# Patient Record
Sex: FEMALE | Race: WHITE
[De-identification: names, ages, dates, MRNs, and addresses within clinical notes are randomized per-mention and may not be internally consistent; named-entity substitution may affect disease eponyms.]

---

## 2022-01-24 ENCOUNTER — HOSPITAL ENCOUNTER (EMERGENCY)
Dept: HOSPITAL 63 - ER | Age: 22
Discharge: HOME | End: 2022-01-24
Payer: COMMERCIAL

## 2022-01-24 VITALS
DIASTOLIC BLOOD PRESSURE: 77 MMHG | SYSTOLIC BLOOD PRESSURE: 114 MMHG | SYSTOLIC BLOOD PRESSURE: 114 MMHG | DIASTOLIC BLOOD PRESSURE: 77 MMHG

## 2022-01-24 VITALS — WEIGHT: 138.89 LBS | HEIGHT: 66 IN | BODY MASS INDEX: 22.32 KG/M2
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
## 2022-01-24 DIAGNOSIS — S93.401A: Primary | ICD-10-CM

## 2022-01-24 DIAGNOSIS — Z88.5: ICD-10-CM

## 2022-01-24 PROCEDURE — 81025 URINE PREGNANCY TEST: CPT

## 2022-01-24 PROCEDURE — 73610 X-RAY EXAM OF ANKLE: CPT

## 2022-01-24 PROCEDURE — 99283 EMERGENCY DEPT VISIT LOW MDM: CPT

## 2022-01-24 NOTE — PHYS DOC
Past History


Past Surgical History:  Other


Additional Past Surgical Histo:  wisdom teeth


Alcohol Use:  Occasionally





General Adult


EDM:


Chief Complaint:  ANKLE PROBLEM





HPI:


HPI:





Patient is a [6-year-old female coming in for right ankle pain and swelling.  

Patient states she was playing basketball when she came down and felt her right 

foot rolled inward.  Has not attempted to bear weight since.  Has had previous 

sprains this ankle surgeries.  Otherwise has been well





Review of Systems:


Review of Systems:


All other systems within normal limits except for as noted in the HPI





Current Medications:


Current Meds:





Current Medications








 Medications


  (Trade)  Dose


 Ordered  Sig/Melissa  Start Time


 Stop Time Status Last Admin


Dose Admin


 


 Ibuprofen


  (Motrin)  600 mg  1X  ONCE  22 19:45


 22 19:46 DC 22 19:51


600 MG











Allergies:


Allergies:





Allergies








Coded Allergies Type Severity Reaction Last Updated Verified


 


  hydrocodone Allergy Severe Rash 22 Yes











Physical Exam:


PE:


Constitutional: Well developed, well nourished, no acute distress, non-toxic ap

pearance. []


HENT: Normocephalic, atraumatic, bilateral external ears normal,  nose normal. 

[]


Eyes: PERRLA, conjunctiva normal, no discharge. [] 


Neck: No rigidity, supple, no stridor. [] 


Cardiovascular: Regular rate and rhythm, brisk cap refill []


Lungs & Thorax: Non labored symmetric respirations, no tachypnea or respiratory 

distress []


Abdomen: Soft, nondistended.


Skin: Warm, dry, no erythema, no rash. [] 


Back: Unremarkable


Extremities: No deformities, range of motion grossly intact, no lower extremity 

edema.  Swelling and tenderness over right lateral malleolus, no tenderness of 

the tarsal calcaneus.  No tenderness on tib-fib squeeze test.  [] 


Neurologic: Alert and oriented X 3, no focal deficits noted. []


Psychologic: Affect normal, judgement normal, mood normal. []





Current Patient Data:


Labs:





                                Laboratory Tests








Test


 22


19:24


 


POC Urine HCG, Qualitative


 hcg negative


(Negative)








Vital Signs:





                                   Vital Signs








  Date Time  Temp Pulse Resp B/P (MAP) Pulse Ox O2 Delivery O2 Flow Rate FiO2


 


22 18:52 97.8 71 18 109/69 (82) 99 Room Air  











EKG:


EKG:


[]





Radiology/Procedures:


Radiology/Procedures:


[facility] [address], [place], [place] [address] [phone]


                                        


                                 IMAGING REPORT





                                     Signed





PATIENT: ESMER CASTELLANOS    ACCOUNT: [id number]     MRN#: [id number]


: 2000           LOCATION: ER              AGE: 21


SEX: F                    EXAM DT: 22         ACCESSION#: [id number]


STATUS: REG ER            ORD. PHYSICIAN: TOSHIA STERLING MD


REASON: Right inversion injury, pain and swelling to right ankle


PROCEDURE: ANKLE RIGHT 3V





Study: XR EXAM OF ANKLE_RIGHT 3VIEWS





Indication: Inversion injury. Pain and swelling.





Comparison: None.





Findings:





Edematous soft tissues primarily at the lateral ankle. No acute fracture is 

identified. Alignment is within normal limits but not fully assessed without 

weightbearing. Unremarkable talar dome. The partially imaged foot is intact.





Impression:





Edematous soft tissues mainly at the lateral ankle but no fracture is identified

 or traumatic malalignment. If there is ongoing inability to bear weight 

consider follow-up weightbearing radiographs in 2 weeks.





Electronically signed by: AUGIE TREADWELL MD (2022 8:07 PM) [facility]














DICTATED AND SIGNED BY:     AUGIE TREADWELL MD


DATE:     22





CC: TOSHIA STERLING MD; PCP,NO ~MTH0 0


[]





Heart Score:


C/O Chest Pain:  No


Risk Factors:


Risk Factors:  DM, Current or recent (<one month) smoker, HTN, HLP, family 

history of CAD, obesity.


Risk Scores:


Score 0 - 3:  2.5% MACE over next 6 weeks - Discharge Home


Score 4 - 6:  20.3% MACE over next 6 weeks - Admit for Clinical Observation


Score 7 - 10:  72.7% MACE over next 6 weeks - Early Invasive Strategies





Course & Med Decision Making:


Course & Med Decision Making


Pertinent Labs and Imaging studies reviewed. (See chart for details)





[]





Dragon Disclaimer:


Dragon Disclaimer:


This electronic medical record was generated, in whole or in part, using a voice

 recognition dictation system.





Departure


Departure:


Impression:  


   Primary Impression:  


   Right ankle sprain


Disposition:   HOME / SELF CARE / HOMELESS


Condition:  STABLE


Referrals:  


PCP,NO (PCP)


Patient Instructions:  RICE - Routine Care for Injuries











TOSHIA STERLING MD            2022 20:14

## 2022-01-24 NOTE — RAD
Study: XR EXAM OF ANKLE_RIGHT 3VIEWS



Indication: Inversion injury. Pain and swelling.



Comparison: None.



Findings:



Edematous soft tissues primarily at the lateral ankle. No acute fracture is identified. Alignment is 
within normal limits but not fully assessed without weightbearing. Unremarkable talar dome. The parti
ally imaged foot is intact.



Impression:



Edematous soft tissues mainly at the lateral ankle but no fracture is identified or traumatic malalig
nment. If there is ongoing inability to bear weight consider follow-up weightbearing radiographs in 2
 weeks.



Electronically signed by: AUGIE TREADWELL MD (1/24/2022 8:07 PM) [facility]

## 2022-04-07 ENCOUNTER — HOSPITAL ENCOUNTER (EMERGENCY)
Dept: HOSPITAL 63 - ER | Age: 22
Discharge: HOME | End: 2022-04-07
Payer: COMMERCIAL

## 2022-04-07 DIAGNOSIS — N13.2: Primary | ICD-10-CM

## 2022-04-07 LAB
APTT PPP: YELLOW S
BACTERIA #/AREA URNS HPF: (no result) /HPF
FIBRINOGEN PPP-MCNC: CLEAR MG/DL
GLUCOSE UR STRIP-MCNC: (no result) MG/DL
NITRITE UR QL STRIP: (no result)
RBC #/AREA URNS HPF: >40 /HPF (ref 0–2)
SP GR UR STRIP: >=1.03
SQUAMOUS #/AREA URNS LPF: (no result) /LPF
UROBILINOGEN UR-MCNC: 0.2 MG/DL
WBC #/AREA URNS HPF: (no result) /HPF (ref 0–4)

## 2022-04-07 PROCEDURE — 74176 CT ABD & PELVIS W/O CONTRAST: CPT

## 2022-04-07 PROCEDURE — 96372 THER/PROPH/DIAG INJ SC/IM: CPT

## 2022-04-07 PROCEDURE — 81025 URINE PREGNANCY TEST: CPT

## 2022-04-07 PROCEDURE — 99284 EMERGENCY DEPT VISIT MOD MDM: CPT

## 2022-04-07 PROCEDURE — 81001 URINALYSIS AUTO W/SCOPE: CPT

## 2022-04-07 NOTE — RAD
Exam: CT abdomen/pelvis without intravenous contrast



Indication: Severe right flank pain and abdominal pain



Comparison: None



Technique: Helical CT imaging performed of the abdomen and pelvis without the use of intravenous cont
rast. Sagittal and coronal reformats were obtained. 



One or more of the following individualized dose reduction techniques were utilized for this examinat
ion:  



1. Automated exposure control

2. Adjustment of the mA and/or kV according to patient size

3. Use of iterative reconstruction technique.



Findings:



Inherently limited evaluation without intravenous contrast.



Lower chest: Normal.



Liver: Normal.



Gallbladder/Biliary Tree: Normal.



Pancreas: Normal.



Spleen: Normal



Adrenal Glands: Normal.



Kidneys/Ureters/Bladder: Mild right hydronephrosis due to a 3 mm calculus near the right ureterovesic
ular junction. No nephrolithiasis. The left kidney, ureter, and bladder are normal.



Reproductive Organs: Normal. 



Stomach, small bowel, and colon: Stomach, small bowel, appendix, and colon are normal.



Vasculature: No aortic aneurysm.



Lymph Nodes: No lymphadenopathy.



Peritoneum and retroperitoneum: No free fluid or free air.



Bones: No acute osseous abnormalities.



Miscellaneous: None.



IMPRESSION: Mild right hydronephrosis due to a 3 mm calculus near the right ureterovesicular junction
.



Findings were discussed by Dr. Barnhart with Dr. Tom at the time of the exam.





Electronically signed by: Karly Barnhart MD (4/7/2022 6:11 AM) [facility]